# Patient Record
Sex: MALE | HISPANIC OR LATINO | Employment: FULL TIME | URBAN - METROPOLITAN AREA
[De-identification: names, ages, dates, MRNs, and addresses within clinical notes are randomized per-mention and may not be internally consistent; named-entity substitution may affect disease eponyms.]

---

## 2021-01-20 ENCOUNTER — SUPERVISING PHYSICIAN REVIEW (OUTPATIENT)
Dept: URGENT CARE | Facility: PHYSICIAN GROUP | Age: 26
End: 2021-01-20

## 2021-01-20 ENCOUNTER — OFFICE VISIT (OUTPATIENT)
Dept: URGENT CARE | Facility: PHYSICIAN GROUP | Age: 26
End: 2021-01-20
Payer: COMMERCIAL

## 2021-01-20 ENCOUNTER — HOSPITAL ENCOUNTER (OUTPATIENT)
Facility: MEDICAL CENTER | Age: 26
End: 2021-01-20
Attending: PHYSICIAN ASSISTANT
Payer: COMMERCIAL

## 2021-01-20 VITALS
BODY MASS INDEX: 27.35 KG/M2 | OXYGEN SATURATION: 96 % | RESPIRATION RATE: 18 BRPM | DIASTOLIC BLOOD PRESSURE: 74 MMHG | HEIGHT: 75 IN | WEIGHT: 220 LBS | HEART RATE: 102 BPM | TEMPERATURE: 98.6 F | SYSTOLIC BLOOD PRESSURE: 122 MMHG

## 2021-01-20 DIAGNOSIS — J02.9 SORE THROAT: ICD-10-CM

## 2021-01-20 DIAGNOSIS — R05.9 COUGH: ICD-10-CM

## 2021-01-20 DIAGNOSIS — R50.9 FEVER, UNSPECIFIED FEVER CAUSE: ICD-10-CM

## 2021-01-20 LAB
COVID ORDER STATUS COVID19: NORMAL
INT CON NEG: NORMAL
INT CON POS: NORMAL
S PYO AG THROAT QL: NEGATIVE
SARS-COV-2 RNA RESP QL NAA+PROBE: DETECTED
SPECIMEN SOURCE: ABNORMAL

## 2021-01-20 PROCEDURE — U0003 INFECTIOUS AGENT DETECTION BY NUCLEIC ACID (DNA OR RNA); SEVERE ACUTE RESPIRATORY SYNDROME CORONAVIRUS 2 (SARS-COV-2) (CORONAVIRUS DISEASE [COVID-19]), AMPLIFIED PROBE TECHNIQUE, MAKING USE OF HIGH THROUGHPUT TECHNOLOGIES AS DESCRIBED BY CMS-2020-01-R: HCPCS

## 2021-01-20 PROCEDURE — U0005 INFEC AGEN DETEC AMPLI PROBE: HCPCS

## 2021-01-20 PROCEDURE — 99203 OFFICE O/P NEW LOW 30 MIN: CPT | Performed by: PHYSICIAN ASSISTANT

## 2021-01-20 PROCEDURE — 87880 STREP A ASSAY W/OPTIC: CPT | Performed by: PHYSICIAN ASSISTANT

## 2021-01-20 ASSESSMENT — ENCOUNTER SYMPTOMS
VOMITING: 0
CHILLS: 1
WHEEZING: 0
FEVER: 1
DIZZINESS: 0
HEADACHES: 0
COUGH: 1
PALPITATIONS: 0
STRIDOR: 0
SINUS PAIN: 0
DIAPHORESIS: 0
NAUSEA: 0
SPUTUM PRODUCTION: 1
SHORTNESS OF BREATH: 0
MYALGIAS: 1
DIARRHEA: 0
ABDOMINAL PAIN: 0
SORE THROAT: 1

## 2021-01-20 NOTE — PROGRESS NOTES
Subjective:   Trae Gilliland is a 26 y.o. male who presents for Fever (bodyaches, chills, cough, sore throat, congestion, x1 month )      HPI:  This is a very pleasant 26-year-old male presented to the clinic with fever, body aches, chills, cough, sore throat and congestion x1 month.  Patient states his symptoms have been intermittent.  Over the last 2 days he noticed a fever return.  He had a fever of 102 last night.  This was able to be brought down with Tylenol.  His cough is productive of clear sputum.  He has a mild sore throat that is worse in the morning.  Continues to have mild generalized body aches and chills.  Has not tried any OTC medications for symptoms.  His work is requiring a Covid test before he may return.  Currently he denies any shortness of breath or chest pain.  He has no known ill contacts.    Review of Systems   Constitutional: Positive for chills, fever and malaise/fatigue. Negative for diaphoresis.   HENT: Positive for congestion and sore throat. Negative for ear pain and sinus pain.    Respiratory: Positive for cough and sputum production. Negative for shortness of breath, wheezing and stridor.    Cardiovascular: Negative for chest pain and palpitations.   Gastrointestinal: Negative for abdominal pain, diarrhea, nausea and vomiting.   Musculoskeletal: Positive for myalgias.   Neurological: Negative for dizziness and headaches.       Medications:    • This patient does not have an active medication from one of the medication groupers.    Allergies: Patient has no known allergies.    Problem List: Trae Gilliland does not have a problem list on file.    Surgical History:  No past surgical history on file.    Past Social Hx: Trae Gilliland  reports that he has been smoking cigarettes. He has been smoking about 0.25 packs per day. He has never used smokeless tobacco. He reports current alcohol use. He reports that he does not use drugs.     Past Family Hx:  Trae Gilliland  "family history is not on file.     Problem list, medications, and allergies reviewed by myself today in Epic.     Objective:     /74 (BP Location: Right arm, Patient Position: Sitting, BP Cuff Size: Large adult)   Pulse (!) 102   Temp 37 °C (98.6 °F) (Temporal)   Resp 18   Ht 1.905 m (6' 3\")   Wt 99.8 kg (220 lb)   SpO2 96%   BMI 27.50 kg/m²     Physical Exam  Constitutional:       General: He is not in acute distress.     Appearance: Normal appearance. He is not ill-appearing, toxic-appearing or diaphoretic.   HENT:      Head: Normocephalic and atraumatic.      Right Ear: Tympanic membrane, ear canal and external ear normal.      Left Ear: Tympanic membrane, ear canal and external ear normal.      Nose: Congestion present. No rhinorrhea.      Mouth/Throat:      Mouth: Mucous membranes are moist.      Pharynx: Posterior oropharyngeal erythema present. No oropharyngeal exudate.      Comments: Posterior oropharynx erythematous.  1+ tonsillar edema without exudate bilaterally.  Uvula midline nondeviated.  Eyes:      Conjunctiva/sclera: Conjunctivae normal.   Neck:      Musculoskeletal: Normal range of motion. No neck rigidity or muscular tenderness.   Cardiovascular:      Rate and Rhythm: Normal rate and regular rhythm.      Pulses: Normal pulses.      Heart sounds: Normal heart sounds.   Pulmonary:      Effort: Pulmonary effort is normal.      Breath sounds: Normal breath sounds. No wheezing, rhonchi or rales.   Lymphadenopathy:      Cervical: No cervical adenopathy.   Skin:     General: Skin is warm and dry.      Capillary Refill: Capillary refill takes less than 2 seconds.   Neurological:      General: No focal deficit present.      Mental Status: He is alert. Mental status is at baseline.   Psychiatric:         Mood and Affect: Mood normal.         Thought Content: Thought content normal.       Rapid strep: Negative    Assessment/Plan:     Diagnosis and associated orders:     1. Sore throat  POCT Rapid " Strep A    COVID/SARS CoV-2 PCR   2. Cough  COVID/SARS CoV-2 PCR   3. Fever, unspecified fever cause  COVID/SARS CoV-2 PCR      Comments/MDM:       Recommended strict isolation precautions.  Stay isolated until I reach out with final results.  Results may take 2 to 3 days.  Recommended supportive treatment including increase fluids and rest.  Use Tylenol and ibuprofen as needed for pain and fever.  Red flag symptoms were discussed with the patient at length today.  If any of these symptoms present return to the clinic or nearest emergency department.  Please call with any questions or concerns.  • Lung sounds were clear to auscultation.  SPO2 96% on room air.  Patient symptoms have been intermittent for the last month.  Requiring a Covid test for work at this time.  Discussed the possibility of a secondary bacterial infection to the length of symptoms.  We will test for Covid pending results discuss further treatment options.           Differential diagnosis, natural history, supportive care, and indications for immediate follow-up discussed.    Advised the patient to follow-up with the primary care physician for recheck, reevaluation, and consideration of further management.    Please note that this dictation was created using voice recognition software. I have made reasonable attempt to correct obvious errors, but I expect that there are errors of grammar and possibly content that I did not discover before finalizing the note.    This note was electronically signed by MITCHELL Fernando PA-C

## 2021-01-24 NOTE — PROGRESS NOTES
I have reviewed and agree with history, assessment and plan for office encounter on 1/20/2021 with Advanced Practice Provider: Feilx Fernando PAC.  Face to face encounter/direct observation: No  Suggested changes to plan or follow-up: none   Felipe Brothers M.D.

## 2021-02-05 ENCOUNTER — OFFICE VISIT (OUTPATIENT)
Dept: URGENT CARE | Facility: PHYSICIAN GROUP | Age: 26
End: 2021-02-05
Payer: COMMERCIAL

## 2021-02-05 VITALS
SYSTOLIC BLOOD PRESSURE: 118 MMHG | TEMPERATURE: 98.2 F | OXYGEN SATURATION: 98 % | DIASTOLIC BLOOD PRESSURE: 76 MMHG | WEIGHT: 220 LBS | BODY MASS INDEX: 29.8 KG/M2 | RESPIRATION RATE: 14 BRPM | HEART RATE: 68 BPM | HEIGHT: 72 IN

## 2021-02-05 DIAGNOSIS — R09.82 POST-NASAL DRIP: ICD-10-CM

## 2021-02-05 DIAGNOSIS — J31.0 RHINITIS, UNSPECIFIED TYPE: ICD-10-CM

## 2021-02-05 PROCEDURE — 99214 OFFICE O/P EST MOD 30 MIN: CPT | Performed by: NURSE PRACTITIONER

## 2021-02-05 RX ORDER — CETIRIZINE HYDROCHLORIDE 10 MG/1
10 TABLET ORAL DAILY
Qty: 30 TAB | Refills: 0 | Status: SHIPPED | OUTPATIENT
Start: 2021-02-05

## 2021-02-05 RX ORDER — METHYLPREDNISOLONE 4 MG/1
TABLET ORAL
Qty: 21 TAB | Refills: 0 | Status: SHIPPED | OUTPATIENT
Start: 2021-02-05

## 2021-02-05 RX ORDER — FLUTICASONE PROPIONATE 50 MCG
1 SPRAY, SUSPENSION (ML) NASAL DAILY
Qty: 16 G | Refills: 0 | Status: SHIPPED | OUTPATIENT
Start: 2021-02-05

## 2021-02-05 ASSESSMENT — ENCOUNTER SYMPTOMS
WHEEZING: 0
SORE THROAT: 0
HEADACHES: 0
SHORTNESS OF BREATH: 0
COUGH: 1
SINUS PAIN: 0
CHILLS: 0
SINUS PRESSURE: 0
FEVER: 0
SWOLLEN GLANDS: 0
HEARTBURN: 0
HEMOPTYSIS: 0
VOMITING: 0

## 2021-02-05 ASSESSMENT — PAIN SCALES - GENERAL: PAINLEVEL: NO PAIN

## 2021-02-05 NOTE — PROGRESS NOTES
Subjective:     Trae Gilliland is a 26 y.o. male who presents for Nasal Congestion (sx started around 1/20/2021. Pt is COVID positive and just got clear. Pt is still congested and states the last dr he saw suggested for him to come back to get steriods to help with it. )      Congestion with post nasal drip x 1.5 months. Feels like something is stuck in the back of his throat at times. Clear nasal drainage. No allergies. Coughing up flem in the mornings. Gag reflex with brushing teeth, no acid reflux at other times. No new animals. No hx of allergies. 1/19, COVID symtoms started. Non-smoker x 1 yr. Had lost taste, now resolved.    Sinusitis  This is a new problem. The current episode started more than 1 month ago. The problem is unchanged. There has been no fever. Associated symptoms include congestion, coughing and sneezing. Pertinent negatives include no chills, ear pain, headaches, shortness of breath, sinus pressure, sore throat or swollen glands. Past treatments include nothing.       No past medical history on file.    No past surgical history on file.    Social History     Socioeconomic History   • Marital status: Single     Spouse name: Not on file   • Number of children: Not on file   • Years of education: Not on file   • Highest education level: Not on file   Occupational History   • Not on file   Social Needs   • Financial resource strain: Not on file   • Food insecurity     Worry: Not on file     Inability: Not on file   • Transportation needs     Medical: Not on file     Non-medical: Not on file   Tobacco Use   • Smoking status: Current Every Day Smoker     Packs/day: 0.25     Types: Cigarettes   • Smokeless tobacco: Never Used   Substance and Sexual Activity   • Alcohol use: Yes     Comment: 2 month   • Drug use: Yes     Types: Marijuana   • Sexual activity: Not on file   Lifestyle   • Physical activity     Days per week: Not on file     Minutes per session: Not on file   • Stress: Not on file    Relationships   • Social connections     Talks on phone: Not on file     Gets together: Not on file     Attends [employer] service: Not on file     Active member of club or organization: Not on file     Attends meetings of clubs or organizations: Not on file     Relationship status: Not on file   • Intimate partner violence     Fear of current or ex partner: Not on file     Emotionally abused: Not on file     Physically abused: Not on file     Forced sexual activity: Not on file   Other Topics Concern   • Not on file   Social History Narrative   • Not on file        No family history on file.     No Known Allergies    Review of Systems   Constitutional: Negative for chills and fever.   HENT: Positive for congestion and sneezing. Negative for ear pain, sinus pressure, sinus pain and sore throat.    Respiratory: Positive for cough. Negative for hemoptysis, shortness of breath and wheezing.    Gastrointestinal: Negative for heartburn and vomiting.   Neurological: Negative for headaches.   Endo/Heme/Allergies: Positive for environmental allergies.   All other systems reviewed and are negative.       Objective:   /76 (BP Location: Right arm, Patient Position: Sitting, BP Cuff Size: Adult)   Pulse 68   Temp 36.8 °C (98.2 °F) (Temporal)   Resp 14   Ht 1.829 m (6')   Wt 99.8 kg (220 lb)   SpO2 98%   BMI 29.84 kg/m²     Physical Exam  Vitals signs reviewed.   Constitutional:       General: He is not in acute distress.     Appearance: He is well-developed.   HENT:      Head: Normocephalic and atraumatic.      Right Ear: Tympanic membrane, ear canal and external ear normal.      Left Ear: Tympanic membrane, ear canal and external ear normal.      Nose: Mucosal edema and rhinorrhea present.      Right Sinus: No maxillary sinus tenderness or frontal sinus tenderness.      Left Sinus: No maxillary sinus tenderness or frontal sinus tenderness.      Mouth/Throat:      Lips: Pink.      Mouth: Mucous membranes are  moist. No oral lesions.      Pharynx: Uvula midline. Posterior oropharyngeal erythema present. No uvula swelling.      Tonsils: No tonsillar exudate or tonsillar abscesses. 1+ on the right. 1+ on the left.      Comments: Clear post nasal drip. Mild oropharyngeal erythema.   Eyes:      Conjunctiva/sclera: Conjunctivae normal.   Neck:      Musculoskeletal: Normal range of motion and neck supple.   Cardiovascular:      Rate and Rhythm: Normal rate.   Pulmonary:      Effort: Pulmonary effort is normal. No respiratory distress.      Breath sounds: Normal breath sounds. No stridor. No wheezing, rhonchi or rales.   Musculoskeletal: Normal range of motion.   Skin:     General: Skin is warm and dry.      Findings: No rash.   Neurological:      General: No focal deficit present.      Mental Status: He is alert and oriented to person, place, and time.      GCS: GCS eye subscore is 4. GCS verbal subscore is 5. GCS motor subscore is 6.   Psychiatric:         Mood and Affect: Mood normal.         Speech: Speech normal.         Behavior: Behavior normal.         Thought Content: Thought content normal.         Judgment: Judgment normal.         Assessment/Plan:   1. Rhinitis, unspecified type  - methylPREDNISolone (MEDROL DOSEPAK) 4 MG Tablet Therapy Pack; Follow schedule on package instructions.  Dispense: 21 Tab; Refill: 0  - fluticasone (FLONASE) 50 MCG/ACT nasal spray; Administer 1 Spray into affected nostril(S) every day.  Dispense: 16 g; Refill: 0  - cetirizine (ZYRTEC ALLERGY) 10 MG Tab; Take 1 Tab by mouth every day.  Dispense: 30 Tab; Refill: 0    2. Post-nasal drip  - fluticasone (FLONASE) 50 MCG/ACT nasal spray; Administer 1 Spray into affected nostril(S) every day.  Dispense: 16 g; Refill: 0  - cetirizine (ZYRTEC ALLERGY) 10 MG Tab; Take 1 Tab by mouth every day.  Dispense: 30 Tab; Refill: 0  -Nasal spray and allergy medications as directed (Zyrtec or Loratadine).  -Drink plenty of fluids.     Follow up with primary  care provider. Urgently for worsening symptoms, persistent fevers, facial swelling, visual changes, weakness, elevated heart rate, stiff neck, symptoms last longer than 10 days, or any other concerns.    Differential diagnosis, natural history, supportive care, and indications for immediate follow-up discussed.    My total time spent caring for the patient on the day of the encounter was 25 minutes.   This does not include time spent on separately billable procedures/tests.

## 2021-02-05 NOTE — PATIENT INSTRUCTIONS
Postnasal Drip  Postnasal drip is the feeling of mucus going down the back of your throat. Mucus is a slimy substance that moistens and cleans your nose and throat, as well as the air pockets in face bones near your forehead and cheeks (sinuses). Small amounts of mucus pass from your nose and sinuses down the back of your throat all the time. This is normal. When you produce too much mucus or the mucus gets too thick, you can feel it.  Some common causes of postnasal drip include:  · Having more mucus because of:  ? A cold or the flu.  ? Allergies.  ? Cold air.  ? Certain medicines.  · Having more mucus that is thicker because of:  ? A sinus or nasal infection.  ? Dry air.  ? A food allergy.  Follow these instructions at home:  Relieving discomfort    · Gargle with a salt-water mixture 3-4 times a day or as needed. To make a salt-water mixture, completely dissolve ½-1 tsp of salt in 1 cup of warm water.  · If the air in your home is dry, use a humidifier to add moisture to the air.  · Use a saline spray or container (neti pot) to flush out the nose (nasal irrigation). These methods can help clear away mucus and keep the nasal passages moist.  General instructions  · Take over-the-counter and prescription medicines only as told by your health care provider.  · Follow instructions from your health care provider about eating or drinking restrictions. You may need to avoid caffeine.  · Avoid things that you know you are allergic to (allergens), like dust, mold, pollen, pets, or certain foods.  · Drink enough fluid to keep your urine pale yellow.  · Keep all follow-up visits as told by your health care provider. This is important.  Contact a health care provider if:  · You have a fever.  · You have a sore throat.  · You have difficulty swallowing.  · You have headache.  · You have sinus pain.  · You have a cough that does not go away.  · The mucus from your nose becomes thick and is green or yellow in color.  · You have  cold or flu symptoms that last more than 10 days.  Summary  · Postnasal drip is the feeling of mucus going down the back of your throat.  · If your health care provider approves, use nasal irrigation or a nasal spray 2?4 times a day.  · Avoid things that you know you are allergic to (allergens), like dust, mold, pollen, pets, or certain foods.  This information is not intended to replace advice given to you by your health care provider. Make sure you discuss any questions you have with your health care provider.  Document Released: 04/02/2018 Document Revised: 04/10/2020 Document Reviewed: 04/02/2018  [employer] Patient Education © 2020 [employer]      Nonallergic Rhinitis  Nonallergic rhinitis is a condition that causes symptoms that affect the nose, such as a runny nose and a stuffed-up nose (nasal congestion) that can make it hard to breathe through the nose. This condition is different from having an allergy (allergic rhinitis). Allergic rhinitis occurs when the body's defense system (immune system) reacts to a substance that you are allergic to (allergen), such as pollen, pet dander, mold, or dust. Nonallergic rhinitis has many similar symptoms, but it is not caused by allergens. Nonallergic rhinitis can be a short-term or long-term problem.  What are the causes?  This condition can be caused by many different things. Some common types of nonallergic rhinitis include:  Infectious rhinitis  · This is usually due to an infection in the upper respiratory tract.  Vasomotor rhinitis  · This is the most common type of long-term nonallergic rhinitis.  · It is caused by too much blood flow through the nose, which makes the tissue inside of the nose swell.  · Symptoms are often triggered by strong odors, cold air, stress, drinking alcohol, cigarette smoke, or changes in the weather.  Occupational rhinitis  · This type is caused by triggers in the workplace, such as chemicals, dusts, animal dander, or air  pollution.  Hormonal rhinitis  · This type occurs in women as a result of an increase in the female hormone estrogen.  · It may occur during pregnancy, puberty, and menstrual cycles.  · Symptoms improve when estrogen levels drop.  Drug-induced rhinitis  Several drugs can cause nonallergic rhinitis, including:  · Medicines that are used to treat high blood pressure, heart disease, and Parkinson disease.  · Aspirin and NSAIDs.  · Over-the-counter nasal decongestant sprays. These can cause a type of nonallergic rhinitis (rhinitis medicamentosa) when they are used for more than a few days.  Nonallergic rhinitis with eosinophilia syndrome (NARES)  · This type is caused by having too much of a certain type of white blood cell (eosinophil).  Nonallergic rhinitis can also be caused by a reaction to eating hot or spicy foods. This does not usually cause long-term symptoms. In some cases, the cause of nonallergic rhinitis is not known.  What increases the risk?  You are more likely to develop this condition if:  · You are 30-60 years of age.  · You are a woman. Women are twice as likely to have this condition.  What are the signs or symptoms?  Common symptoms of this condition include:  · Nasal congestion.  · Runny nose.  · The feeling of mucus going down the back of the throat (postnasal drip).  · Trouble sleeping at night and daytime sleepiness.  Less common symptoms include:  · Sneezing.  · Coughing.  · Itchy nose.  · Bloodshot eyes.  How is this diagnosed?  This condition may be diagnosed based on:  · Your symptoms and medical history.  · A physical exam.  · Allergy testing to rule out allergic rhinitis. You may have skin tests or blood tests.  In some cases, the health care provider may take a swab of nasal secretions to look for an increased number of eosinophils. This would be done to confirm a diagnosis of NARES.  How is this treated?  Treatment for this condition depends on the cause. No single treatment works for  everyone. Work with your health care provider to find the best treatment for you. Treatment may include:  · Avoiding the things that trigger your symptoms.  · Using medicines to relieve congestion, such as:  ? Steroid nasal spray. There are many types. You may need to try a few to find out which one works best.  ? Decongestant medicine. This may be an oral medicine or a nasal spray. These medicines are only used for a short time.  · Using medicines to relieve a runny nose. These may include antihistamine medicines or anticholinergic nasal sprays.    Surgery to remove tissue from inside the nose may be needed in severe cases if the condition has not improved after 6-12 months of medical treatment.  Follow these instructions at home:  · Take or use over-the-counter and prescription medicines only as told by your health care provider. Do not stop using your medicine even if you start to feel better.  · Use salt-water (saline) rinses or other solutions (nasal washes or irrigations) to wash or rinse out the inside of your nose as told by your health care provider.  · Do not take NSAIDs or medicines that contain aspirin if they make your symptoms worse.  · Do not drink alcohol if it makes your symptoms worse.  · Do not use any tobacco products, such as cigarettes, chewing tobacco, and e-cigarettes. If you need help quitting, ask your health care provider.  · Avoid secondhand smoke.  · Get some exercise every day. Exercise may help reduce symptoms of nonallergic rhinitis for some people. Ask your health care provider how much exercise and what types of exercise are safe for you.  · Sleep with the head of your bed raised (elevated). This may reduce nighttime nasal congestion.  · Keep all follow-up visits as told by your health care provider. This is important.  Contact a health care provider if:  · You have a fever.  · Your symptoms are getting worse at home.  · Your symptoms are not responding to medicine.  · You develop  new symptoms, especially a headache or nosebleed.  This information is not intended to replace advice given to you by your health care provider. Make sure you discuss any questions you have with your health care provider.  Document Released: 04/10/2017 Document Revised: 11/30/2018 Document Reviewed: 03/09/2017  [employer] Patient Education © 2020 [employer]      Sinusitis, Adult  Sinusitis is inflammation of your sinuses. Sinuses are hollow spaces in the bones around your face. Your sinuses are located:  · Around your eyes.  · In the middle of your forehead.  · Behind your nose.  · In your cheekbones.  Mucus normally drains out of your sinuses. When your nasal tissues become inflamed or swollen, mucus can become trapped or blocked. This allows bacteria, viruses, and fungi to grow, which leads to infection. Most infections of the sinuses are caused by a virus.  Sinusitis can develop quickly. It can last for up to 4 weeks (acute) or for more than 12 weeks (chronic). Sinusitis often develops after a cold.  What are the causes?  This condition is caused by anything that creates swelling in the sinuses or stops mucus from draining. This includes:  · Allergies.  · Asthma.  · Infection from bacteria or viruses.  · Deformities or blockages in your nose or sinuses.  · Abnormal growths in the nose (nasal polyps).  · Pollutants, such as chemicals or irritants in the air.  · Infection from fungi (rare).  What increases the risk?  You are more likely to develop this condition if you:  · Have a weak body defense system (immune system).  · Do a lot of swimming or diving.  · Overuse nasal sprays.  · Smoke.  What are the signs or symptoms?  The main symptoms of this condition are pain and a feeling of pressure around the affected sinuses. Other symptoms include:  · Stuffy nose or congestion.  · Thick drainage from your nose.  · Swelling and warmth over the affected sinuses.  · Headache.  · Upper toothache.  · A cough that may get  worse at night.  · Extra mucus that collects in the throat or the back of the nose (postnasal drip).  · Decreased sense of smell and taste.  · Fatigue.  · A fever.  · Sore throat.  · Bad breath.  How is this diagnosed?  This condition is diagnosed based on:  · Your symptoms.  · Your medical history.  · A physical exam.  · Tests to find out if your condition is acute or chronic. This may include:  ? Checking your nose for nasal polyps.  ? Viewing your sinuses using a device that has a light (endoscope).  ? Testing for allergies or bacteria.  ? Imaging tests, such as an MRI or CT scan.  In rare cases, a bone biopsy may be done to rule out more serious types of fungal sinus disease.  How is this treated?  Treatment for sinusitis depends on the cause and whether your condition is chronic or acute.  · If caused by a virus, your symptoms should go away on their own within 10 days. You may be given medicines to relieve symptoms. They include:  ? Medicines that shrink swollen nasal passages (topical intranasal decongestants).  ? Medicines that treat allergies (antihistamines).  ? A spray that eases inflammation of the nostrils (topical intranasal corticosteroids).  ? Rinses that help get rid of thick mucus in your nose (nasal saline washes).  · If caused by bacteria, your health care provider may recommend waiting to see if your symptoms improve. Most bacterial infections will get better without antibiotic medicine. You may be given antibiotics if you have:  ? A severe infection.  ? A weak immune system.  · If caused by narrow nasal passages or nasal polyps, you may need to have surgery.  Follow these instructions at home:  Medicines  · Take, use, or apply over-the-counter and prescription medicines only as told by your health care provider. These may include nasal sprays.  · If you were prescribed an antibiotic medicine, take it as told by your health care provider. Do not stop taking the antibiotic even if you start to  feel better.  Hydrate and humidify    · Drink enough fluid to keep your urine pale yellow. Staying hydrated will help to thin your mucus.  · Use a cool mist humidifier to keep the humidity level in your home above 50%.  · Inhale steam for 10-15 minutes, 3-4 times a day, or as told by your health care provider. You can do this in the bathroom while a hot shower is running.  · Limit your exposure to cool or dry air.  Rest  · Rest as much as possible.  · Sleep with your head raised (elevated).  · Make sure you get enough sleep each night.  General instructions    · Apply a warm, moist washcloth to your face 3-4 times a day or as told by your health care provider. This will help with discomfort.  · Wash your hands often with soap and water to reduce your exposure to germs. If soap and water are not available, use hand .  · Do not smoke. Avoid being around people who are smoking (secondhand smoke).  · Keep all follow-up visits as told by your health care provider. This is important.  Contact a health care provider if:  · You have a fever.  · Your symptoms get worse.  · Your symptoms do not improve within 10 days.  Get help right away if:  · You have a severe headache.  · You have persistent vomiting.  · You have severe pain or swelling around your face or eyes.  · You have vision problems.  · You develop confusion.  · Your neck is stiff.  · You have trouble breathing.  Summary  · Sinusitis is soreness and inflammation of your sinuses. Sinuses are hollow spaces in the bones around your face.  · This condition is caused by nasal tissues that become inflamed or swollen. The swelling traps or blocks the flow of mucus. This allows bacteria, viruses, and fungi to grow, which leads to infection.  · If you were prescribed an antibiotic medicine, take it as told by your health care provider. Do not stop taking the antibiotic even if you start to feel better.  · Keep all follow-up visits as told by your health care  provider. This is important.  This information is not intended to replace advice given to you by your health care provider. Make sure you discuss any questions you have with your health care provider.  Document Released: 12/18/2006 Document Revised: 05/20/2019 Document Reviewed: 05/20/2019  [employer] Patient Education © 2020 [employer]

## 2024-08-05 ENCOUNTER — APPOINTMENT (OUTPATIENT)
Dept: MEDICAL GROUP | Facility: IMAGING CENTER | Age: 29
End: 2024-08-05